# Patient Record
Sex: MALE | Race: WHITE | URBAN - METROPOLITAN AREA
[De-identification: names, ages, dates, MRNs, and addresses within clinical notes are randomized per-mention and may not be internally consistent; named-entity substitution may affect disease eponyms.]

---

## 2020-05-05 ENCOUNTER — OFFICE VISIT (OUTPATIENT)
Dept: URBAN - METROPOLITAN AREA CLINIC 23 | Facility: CLINIC | Age: 80
End: 2020-05-05
Payer: MEDICARE

## 2020-05-05 DIAGNOSIS — H35.371 PUCKERING OF MACULA, RIGHT EYE: ICD-10-CM

## 2020-05-05 DIAGNOSIS — H26.493 OTHER SECONDARY CATARACT, BILATERAL: Primary | ICD-10-CM

## 2020-05-05 DIAGNOSIS — E11.3293 TYPE 2 DIAB W MILD NONPRLF DIABETIC RTNOP W/O MACULAR EDEMA, BILATERAL: ICD-10-CM

## 2020-05-05 PROCEDURE — 92004 COMPRE OPH EXAM NEW PT 1/>: CPT | Performed by: OPHTHALMOLOGY

## 2020-05-05 ASSESSMENT — INTRAOCULAR PRESSURE
OD: 14
OS: 16

## 2020-05-05 NOTE — IMPRESSION/PLAN
Impression: Puckering of macula, right eye: H35.371. OD. Condition: stable. Vision: vision not affected. Plan: Advised patient of condition. Discussed diagnosis in detail with patient. No treatment is required at this time. Will continue to observe condition and or symptoms.  OCT shows ILM/ERM OD and Optos shows Distortion centrally OD

## 2020-05-05 NOTE — IMPRESSION/PLAN
Impression: Type 2 diab w mild nonprlf diabetic rtnop w/o macular edema, bilateral: V08.8872. OU. Condition: stable. Vision: vision not affected. Diabetic for 40yrs last A1C 6.4 in December 2019 Plan: Discussed diagnosis in detail with patient. Exam shows minimal Diabetic changes. No treatment is recommended at this time. Emphasized blood sugar control and advised to keep future appointments with PCP and/or Endocrinologist for the management of Diabetes. Recommend observation for now.  OCT shows ILM/ERM OD and Stable OS

## 2020-05-05 NOTE — IMPRESSION/PLAN
Impression: Other secondary cataract, bilateral: H26.493. OU. Condition: worsening. Vision: vision affected. Plan: Discussed diagnosis in detail with patient. Recommend Yag Laser treatment Right Eye then Left eye for vision improvement. Discussed risks/benefits of laser TX. All questions answered. Patient elects to proceed with recommendation.  RL1

## 2020-05-26 ENCOUNTER — SURGERY (OUTPATIENT)
Dept: URBAN - METROPOLITAN AREA SURGERY 11 | Facility: SURGERY | Age: 80
End: 2020-05-26
Payer: MEDICARE

## 2020-05-26 PROCEDURE — 66821 AFTER CATARACT LASER SURGERY: CPT | Performed by: OPHTHALMOLOGY

## 2022-04-22 ENCOUNTER — OFFICE VISIT (OUTPATIENT)
Dept: URBAN - METROPOLITAN AREA CLINIC 28 | Facility: CLINIC | Age: 82
End: 2022-04-22
Payer: MEDICARE

## 2022-04-22 DIAGNOSIS — E11.3293 TYPE 2 DIABETES MELLITUS WITH MILD NONPROLIFERATIVE DIABETIC RETINOPATHY WITHOUT MACULAR EDEMA, BILATERAL: Primary | ICD-10-CM

## 2022-04-22 DIAGNOSIS — H26.492 OTHER SECONDARY CATARACT, LEFT EYE: ICD-10-CM

## 2022-04-22 DIAGNOSIS — H35.373 PUCKERING OF MACULA, BILATERAL: ICD-10-CM

## 2022-04-22 PROCEDURE — 92134 CPTRZ OPH DX IMG PST SGM RTA: CPT | Performed by: OPTOMETRIST

## 2022-04-22 PROCEDURE — 92004 COMPRE OPH EXAM NEW PT 1/>: CPT | Performed by: OPTOMETRIST

## 2022-04-22 PROCEDURE — 92250 FUNDUS PHOTOGRAPHY W/I&R: CPT | Performed by: OPTOMETRIST

## 2022-04-22 ASSESSMENT — INTRAOCULAR PRESSURE
OD: 17
OS: 17

## 2022-04-22 ASSESSMENT — KERATOMETRY
OD: 43.50
OS: 43.88

## 2022-04-22 NOTE — IMPRESSION/PLAN
Impression: Other secondary cataract, left eye: H26.492. Plan: Educated on posterior capsule opacification and impact on vision. Pt not bothered by vision at this time. Based on symptoms and exam findings, continue to monitor. Monitor.

## 2022-04-22 NOTE — IMPRESSION/PLAN
Impression: Puckering of macula, bilateral: H35.373. Plan: Educated on exam findings. Stable/mild ERM OU with macula OCT today. Educated and reassured on condition. Continue to monitor annually or sooner with any changes/distortions in vision.

## 2022-04-22 NOTE — IMPRESSION/PLAN
Impression: Type 2 diabetes mellitus with mild nonproliferative diabetic retinopathy without macular edema, bilateral: U89.6072. Plan: Educated on importance of blood glucose control as related to ocular health. Mild nonproliferative diabetic retinopathy without maculopathy present. Monitor with repeat dilation.

## 2023-04-21 ENCOUNTER — OFFICE VISIT (OUTPATIENT)
Dept: URBAN - METROPOLITAN AREA CLINIC 28 | Facility: CLINIC | Age: 83
End: 2023-04-21
Payer: MEDICARE

## 2023-04-21 DIAGNOSIS — E11.3293 TYPE 2 DIABETES MELLITUS WITH MILD NONPROLIFERATIVE DIABETIC RETINOPATHY WITHOUT MACULAR EDEMA, BILATERAL: Primary | ICD-10-CM

## 2023-04-21 DIAGNOSIS — H26.492 OTHER SECONDARY CATARACT, LEFT EYE: ICD-10-CM

## 2023-04-21 DIAGNOSIS — H35.373 PUCKERING OF MACULA, BILATERAL: ICD-10-CM

## 2023-04-21 PROCEDURE — 92014 COMPRE OPH EXAM EST PT 1/>: CPT | Performed by: OPTOMETRIST

## 2023-04-21 ASSESSMENT — INTRAOCULAR PRESSURE
OS: 16
OD: 16

## 2023-04-21 NOTE — IMPRESSION/PLAN
Impression: Type 2 diabetes mellitus with mild nonproliferative diabetic retinopathy without macular edema, bilateral: V56.9815. Plan: Educated on importance of blood glucose control as related to ocular health. Mild nonproliferative diabetic retinopathy without maculopathy present. Monitor with repeat dilation.

## 2023-04-21 NOTE — IMPRESSION/PLAN
Impression: Puckering of macula, bilateral: H35.373. Plan: Educated on exam findings. Stable/mild ERM OU. Educated and reassured on condition. Continue to monitor annually or sooner with any changes/distortions in vision.

## 2024-04-26 ENCOUNTER — OFFICE VISIT (OUTPATIENT)
Dept: URBAN - METROPOLITAN AREA CLINIC 28 | Facility: CLINIC | Age: 84
End: 2024-04-26
Payer: MEDICARE

## 2024-04-26 DIAGNOSIS — E11.3293 TYPE 2 DIABETES MELLITUS WITH MILD NONPROLIFERATIVE DIABETIC RETINOPATHY WITHOUT MACULAR EDEMA, BILATERAL: Primary | ICD-10-CM

## 2024-04-26 DIAGNOSIS — H35.373 PUCKERING OF MACULA, BILATERAL: ICD-10-CM

## 2024-04-26 DIAGNOSIS — Z79.84 LONG TERM (CURRENT) USE OF ORAL ANTIDIABETIC DRUGS: ICD-10-CM

## 2024-04-26 DIAGNOSIS — H26.492 OTHER SECONDARY CATARACT, LEFT EYE: ICD-10-CM

## 2024-04-26 PROCEDURE — 92014 COMPRE OPH EXAM EST PT 1/>: CPT | Performed by: OPTOMETRIST

## 2024-04-26 PROCEDURE — 92134 CPTRZ OPH DX IMG PST SGM RTA: CPT | Performed by: OPTOMETRIST

## 2024-04-26 ASSESSMENT — INTRAOCULAR PRESSURE
OD: 16
OS: 16

## 2024-04-26 ASSESSMENT — KERATOMETRY
OD: 43.75
OS: 43.50

## 2024-05-01 ENCOUNTER — OFFICE VISIT (OUTPATIENT)
Dept: URBAN - METROPOLITAN AREA CLINIC 28 | Facility: CLINIC | Age: 84
End: 2024-05-01
Payer: MEDICARE

## 2024-05-01 DIAGNOSIS — H26.492 OTHER SECONDARY CATARACT, LEFT EYE: ICD-10-CM

## 2024-05-01 PROCEDURE — 99204 OFFICE O/P NEW MOD 45 MIN: CPT | Performed by: OPHTHALMOLOGY

## 2024-05-01 ASSESSMENT — INTRAOCULAR PRESSURE
OD: 15
OS: 15

## 2024-05-01 ASSESSMENT — KERATOMETRY
OS: 43.63
OD: 43.63

## 2024-05-01 ASSESSMENT — VISUAL ACUITY
OS: 20/30
OD: 20/25

## 2024-06-20 ENCOUNTER — POST-OPERATIVE VISIT (OUTPATIENT)
Dept: URBAN - METROPOLITAN AREA CLINIC 28 | Facility: CLINIC | Age: 84
End: 2024-06-20
Payer: MEDICARE

## 2024-06-20 DIAGNOSIS — Z48.810 ENCOUNTER FOR SURGICAL AFTERCARE FOLLOWING SURGERY ON A SENSE ORGAN: Primary | ICD-10-CM

## 2024-06-20 PROCEDURE — 99024 POSTOP FOLLOW-UP VISIT: CPT | Performed by: OPTOMETRIST

## 2024-06-20 ASSESSMENT — INTRAOCULAR PRESSURE
OD: 17
OS: 17

## 2024-06-20 ASSESSMENT — KERATOMETRY
OD: 43.38
OS: 43.38

## 2025-06-24 ENCOUNTER — OFFICE VISIT (OUTPATIENT)
Dept: URBAN - METROPOLITAN AREA CLINIC 28 | Facility: CLINIC | Age: 85
End: 2025-06-24
Payer: MEDICARE

## 2025-06-24 DIAGNOSIS — H52.4 PRESBYOPIA: ICD-10-CM

## 2025-06-24 DIAGNOSIS — H35.373 PUCKERING OF MACULA, BILATERAL: ICD-10-CM

## 2025-06-24 DIAGNOSIS — H43.813 VITREOUS DEGENERATION, BILATERAL: ICD-10-CM

## 2025-06-24 DIAGNOSIS — E11.3293 TYPE 2 DIABETES MELLITUS WITH MILD NONPROLIFERATIVE DIABETIC RETINOPATHY WITHOUT MACULAR EDEMA, BILATERAL: Primary | ICD-10-CM

## 2025-06-24 DIAGNOSIS — Z96.1 PRESENCE OF INTRAOCULAR LENS: ICD-10-CM

## 2025-06-24 DIAGNOSIS — Z79.84 LONG TERM (CURRENT) USE OF ORAL ANTIDIABETIC DRUGS: ICD-10-CM

## 2025-06-24 PROCEDURE — 92250 FUNDUS PHOTOGRAPHY W/I&R: CPT | Performed by: OPTOMETRIST

## 2025-06-24 PROCEDURE — 92134 CPTRZ OPH DX IMG PST SGM RTA: CPT | Performed by: OPTOMETRIST

## 2025-06-24 PROCEDURE — 92014 COMPRE OPH EXAM EST PT 1/>: CPT | Performed by: OPTOMETRIST

## 2025-06-24 ASSESSMENT — VISUAL ACUITY
OS: 20/25
OD: 20/30

## 2025-06-24 ASSESSMENT — INTRAOCULAR PRESSURE
OS: 17
OD: 16